# Patient Record
Sex: MALE | Race: ASIAN | NOT HISPANIC OR LATINO | URBAN - METROPOLITAN AREA
[De-identification: names, ages, dates, MRNs, and addresses within clinical notes are randomized per-mention and may not be internally consistent; named-entity substitution may affect disease eponyms.]

---

## 2025-02-27 ENCOUNTER — OFFICE VISIT (OUTPATIENT)
Dept: URGENT CARE | Facility: CLINIC | Age: 40
End: 2025-02-27

## 2025-02-27 VITALS
TEMPERATURE: 99 F | OXYGEN SATURATION: 96 % | HEART RATE: 94 BPM | SYSTOLIC BLOOD PRESSURE: 132 MMHG | DIASTOLIC BLOOD PRESSURE: 73 MMHG | RESPIRATION RATE: 16 BRPM

## 2025-02-27 DIAGNOSIS — J22 LOWER RESPIRATORY INFECTION: ICD-10-CM

## 2025-02-27 DIAGNOSIS — J02.9 SORE THROAT: ICD-10-CM

## 2025-02-27 DIAGNOSIS — B37.42 CANDIDAL BALANITIS: Primary | ICD-10-CM

## 2025-02-27 DIAGNOSIS — Z11.3 SCREENING EXAMINATION FOR STD (SEXUALLY TRANSMITTED DISEASE): ICD-10-CM

## 2025-02-27 DIAGNOSIS — B37.42 CANDIDAL BALANITIS: ICD-10-CM

## 2025-02-27 LAB
POC APPEARANCE, URINE: CLEAR
POC BILIRUBIN, URINE: NEGATIVE
POC BLOOD, URINE: ABNORMAL
POC COLOR, URINE: YELLOW
POC GLUCOSE, URINE: NEGATIVE MG/DL
POC KETONES, URINE: NEGATIVE MG/DL
POC LEUKOCYTES, URINE: NEGATIVE
POC NITRITE,URINE: NEGATIVE
POC PH, URINE: 6 PH
POC PROTEIN, URINE: NEGATIVE MG/DL
POC SPECIFIC GRAVITY, URINE: 1.02
POC UROBILINOGEN, URINE: 0.2 EU/DL

## 2025-02-27 PROCEDURE — 99214 OFFICE O/P EST MOD 30 MIN: CPT | Performed by: PHYSICIAN ASSISTANT

## 2025-02-27 PROCEDURE — 81003 URINALYSIS AUTO W/O SCOPE: CPT | Performed by: PHYSICIAN ASSISTANT

## 2025-02-27 RX ORDER — AZITHROMYCIN 250 MG/1
TABLET, FILM COATED ORAL
Qty: 6 TABLET | Refills: 0 | Status: SHIPPED | OUTPATIENT
Start: 2025-02-27

## 2025-02-27 RX ORDER — CLOTRIMAZOLE 1 %
CREAM (GRAM) TOPICAL 2 TIMES DAILY
Qty: 60 G | Refills: 0 | Status: SHIPPED | OUTPATIENT
Start: 2025-02-27 | End: 2025-03-13

## 2025-02-27 RX ORDER — FLUCONAZOLE 150 MG/1
TABLET ORAL
Qty: 2 TABLET | Refills: 0 | Status: SHIPPED | OUTPATIENT
Start: 2025-02-27

## 2025-02-27 RX ORDER — PREDNISONE 20 MG/1
20 TABLET ORAL 2 TIMES DAILY
Qty: 6 TABLET | Refills: 0 | Status: SHIPPED | OUTPATIENT
Start: 2025-02-27 | End: 2025-03-02

## 2025-02-27 RX ORDER — BROMPHENIRAMINE MALEATE, PSEUDOEPHEDRINE HYDROCHLORIDE, AND DEXTROMETHORPHAN HYDROBROMIDE 2; 30; 10 MG/5ML; MG/5ML; MG/5ML
10 SYRUP ORAL 4 TIMES DAILY PRN
Qty: 200 ML | Refills: 0 | Status: SHIPPED | OUTPATIENT
Start: 2025-02-27 | End: 2025-03-04

## 2025-02-27 NOTE — PATIENT INSTRUCTIONS
Your urine looked essentially normal aside from trace blood - could be due to inflammation of the penis vs history of kidney stones, would recommend having this re-checked by PCP  STD testing completed per your request - we will contact you with results   Based on your exam, your symptoms are likely related to balanitis (inflammation of the penis) caused by candida (yeast)  Use the clotrimazole ointment first, then if no improvement, try the Diflucan  You should keep the area clean daily with mild soap and water and make sure the area is DRY, as yeast thrives in dark, moist environments  Would recommend abstaining from intercourse until symptoms are improved

## 2025-02-27 NOTE — PROGRESS NOTES
Subjective   Patient ID: Americo Germain is a 39 y.o. male who presents for penile odor.    HPI     Pt c/o a penile odor x 2 months, happens on and off. States that he does see a white discharge between the foreskin and the penis occasionally. Will clean it but comes back in 2-3 days. Denies: fever, chills, headache, dizziness, abdominal pain, N/V/D, hematuria, flank pain. He does have a Hx of a kidney stone, but states that he has not had similar pain to this since this started. Denies Hx of diabetes mellitus. Usually showers, does not typically take baths. He is currently sexually active, uses condoms. He is only active with one female partner.     Addendum: Pt came back to the office shortly after leaving and decided that he also wanted to be seen for sore throat, cough, sneezing x 2-3 days. Denies: fever, chills, body aches, headache, dizziness, CP, SOB, abdominal pain, N/V/D, rash, swelling, bruising, ear pain, loss of sense of taste/smell. Reports that Sx are overall getting worse. He took NyQuil last night without improvement.     Review of Systems   All other systems reviewed and are negative.      Objective   /73   Pulse 94   Temp 37.2 °C (99 °F)   Resp 16   SpO2 96%     Physical Exam  Vitals reviewed.   Constitutional:       General: He is awake.      Appearance: Normal appearance. He is well-developed.   HENT:      Head: Normocephalic and atraumatic.      Right Ear: Tympanic membrane and ear canal normal.      Left Ear: Tympanic membrane and ear canal normal.      Nose: Nose normal.      Mouth/Throat:      Lips: Pink.      Mouth: Mucous membranes are moist.      Pharynx: Uvula midline. Posterior oropharyngeal erythema present. No pharyngeal swelling.      Tonsils: No tonsillar exudate. 0 on the right. 0 on the left.   Cardiovascular:      Rate and Rhythm: Normal rate and regular rhythm.   Pulmonary:      Effort: Pulmonary effort is normal.      Breath sounds: Examination of the left-upper field  reveals rales. Rales present. No decreased breath sounds, wheezing or rhonchi.   Genitourinary:     Pubic Area: No rash.       Penis: Uncircumcised. Erythema and discharge (white, chunky) present. No tenderness, swelling or lesions.    Musculoskeletal:      Cervical back: Full passive range of motion without pain.      Right lower leg: No edema.      Left lower leg: No edema.   Skin:     General: Skin is warm and dry.      Findings: No lesion or rash.   Neurological:      General: No focal deficit present.      Mental Status: He is alert and oriented to person, place, and time.      Cranial Nerves: No facial asymmetry.      Motor: Motor function is intact.      Gait: Gait is intact.   Psychiatric:         Attention and Perception: Attention normal.         Mood and Affect: Mood and affect normal.       Assessment/Plan   Problem List Items Addressed This Visit    None  Visit Diagnoses         Codes    Candidal balanitis    -  Primary B37.42    Relevant Medications    clotrimazole 1 % ointment    fluconazole (Diflucan) 150 mg tablet    Screening examination for STD (sexually transmitted disease)     Z11.3    Relevant Orders    POCT UA Automated manually resulted (Completed)    Trichomonas vaginalis, Amplified    C. trachomatis / N. gonorrhoeae, Amplified, Urogenital    Lower respiratory infection     J22    Relevant Medications    azithromycin (Zithromax) 250 mg tablet    brompheniramine-pseudoeph-DM 2-30-10 mg/5 mL syrup    Sore throat     J02.9    Relevant Medications    predniSONE (Deltasone) 20 mg tablet          Your urine looked essentially normal aside from trace blood - could be due to inflammation of the penis vs history of kidney stones, would recommend having this re-checked by PCP  STD testing completed per your request - we will contact you with results   Based on your exam, your symptoms are likely related to balanitis (inflammation of the penis) caused by candida (yeast)  Use the clotrimazole ointment  first, then if no improvement, try the Diflucan  You should keep the area clean daily with mild soap and water and make sure the area is DRY, as yeast thrives in dark, moist environments  Would recommend abstaining from intercourse until symptoms are improved    Addendum:   Concern for possible PNA in the left upper lobe  Zpak Rx  Pt requesting Rx medication for sore throat and cough - OTC not working per pt. Prednisone x 3 days and Bromfed Rx.   Increase rest and fluids.   If any worsening symptoms.     Red flag symptoms reviewed with patient and all questions answered. Patient or parent/guardian verbalized understanding and agreement with care plan as above. All in office testing reviewed with patient. If symptoms worsen or do not improve, patient is to follow up with PCP or report to the ER.

## 2025-02-28 LAB
C TRACH RRNA SPEC QL NAA+PROBE: NOT DETECTED
N GONORRHOEA RRNA SPEC QL NAA+PROBE: NOT DETECTED
QUEST GC CT AMPLIFIED (ALWAYS MESSAGE): NORMAL
T VAGINALIS RRNA SPEC QL NAA+PROBE: NOT DETECTED

## 2025-06-17 ENCOUNTER — OFFICE VISIT (OUTPATIENT)
Facility: CLINIC | Age: 40
End: 2025-06-17

## 2025-06-17 VITALS
OXYGEN SATURATION: 96 % | HEART RATE: 81 BPM | WEIGHT: 154 LBS | RESPIRATION RATE: 16 BRPM | BODY MASS INDEX: 25.63 KG/M2 | DIASTOLIC BLOOD PRESSURE: 74 MMHG | TEMPERATURE: 98.8 F | SYSTOLIC BLOOD PRESSURE: 146 MMHG

## 2025-06-17 DIAGNOSIS — J30.9 ALLERGIC RHINITIS, UNSPECIFIED SEASONALITY, UNSPECIFIED TRIGGER: Primary | ICD-10-CM

## 2025-06-17 DIAGNOSIS — J02.9 PHARYNGITIS, UNSPECIFIED ETIOLOGY: ICD-10-CM

## 2025-06-17 LAB — POC RAPID STREP: NEGATIVE

## 2025-06-17 PROCEDURE — 99214 OFFICE O/P EST MOD 30 MIN: CPT | Performed by: NURSE PRACTITIONER

## 2025-06-17 PROCEDURE — 87880 STREP A ASSAY W/OPTIC: CPT | Performed by: NURSE PRACTITIONER

## 2025-06-17 RX ORDER — CETIRIZINE HYDROCHLORIDE, PSEUDOEPHEDRINE HYDROCHLORIDE 5; 120 MG/1; MG/1
1 TABLET, FILM COATED, EXTENDED RELEASE ORAL 2 TIMES DAILY
Qty: 60 TABLET | Refills: 11 | Status: SHIPPED | OUTPATIENT
Start: 2025-06-17 | End: 2026-06-17

## 2025-06-17 NOTE — PROGRESS NOTES
Subjective   Patient ID: Americo Germain is a 40 y.o. male who presents for Sore Throat and Cough.    HPI   Runny nose, sore throat and cough x 6 days, no history of allergies, no treatment prior to arrival.  States the runny nose is clear in nature, sore throat worse in the morning cough is productive of occasional clear sputum.  Denies any eye irritation, body aches.    Review of Systems   All other systems reviewed and are negative.      Objective   /74   Pulse 81   Temp 37.1 °C (98.8 °F)   Resp 16   Wt 69.9 kg (154 lb)   SpO2 96%   BMI 25.63 kg/m²     Physical Exam  Vitals and nursing note reviewed.   Constitutional:       General: He is not in acute distress.     Appearance: Normal appearance. He is not toxic-appearing.   HENT:      Head: Atraumatic.      Right Ear: External ear normal.      Left Ear: External ear normal.      Nose: Nose normal.      Mouth/Throat:      Mouth: Mucous membranes are moist.      Pharynx: Oropharynx is clear. Uvula midline.      Tonsils: No tonsillar exudate or tonsillar abscesses.   Eyes:      Extraocular Movements: Extraocular movements intact.      Conjunctiva/sclera: Conjunctivae normal.   Cardiovascular:      Rate and Rhythm: Normal rate and regular rhythm.      Heart sounds: No murmur heard.     No gallop.   Pulmonary:      Effort: Pulmonary effort is normal. No respiratory distress.      Breath sounds: Normal breath sounds. No wheezing.   Musculoskeletal:         General: Normal range of motion.      Cervical back: Normal range of motion.   Skin:     General: Skin is warm and dry.      Capillary Refill: Capillary refill takes less than 2 seconds.      Coloration: Skin is not jaundiced.   Neurological:      General: No focal deficit present.      Mental Status: He is alert and oriented to person, place, and time.   Psychiatric:         Mood and Affect: Mood normal.         Behavior: Behavior normal.         Thought Content: Thought content normal.          Assessment/Plan   Diagnoses and all orders for this visit:  Allergic rhinitis, unspecified seasonality, unspecified trigger  -     cetirizine-pseudoephedrine (ZyrTEC-D) 5-120 mg 12 hr tablet; Take 1 tablet by mouth 2 times a day.  Pharyngitis, unspecified etiology  -     POCT rapid strep A manually resulted